# Patient Record
Sex: FEMALE | URBAN - METROPOLITAN AREA
[De-identification: names, ages, dates, MRNs, and addresses within clinical notes are randomized per-mention and may not be internally consistent; named-entity substitution may affect disease eponyms.]

---

## 2024-04-22 ENCOUNTER — OV NP (OUTPATIENT)
Dept: URBAN - METROPOLITAN AREA CLINIC 88 | Facility: CLINIC | Age: 49
End: 2024-04-22

## 2024-04-22 NOTE — HPI-TODAY'S VISIT:
49 y.o. presents today to schedule screening colonoscopy.  Denies undergoing prior colonoscopy or family history of colon cancer.  Patient has not had signs of rectal bleeding, changes in bowel habits, constipation, or diarrhea.